# Patient Record
Sex: MALE | Race: WHITE | NOT HISPANIC OR LATINO | URBAN - METROPOLITAN AREA
[De-identification: names, ages, dates, MRNs, and addresses within clinical notes are randomized per-mention and may not be internally consistent; named-entity substitution may affect disease eponyms.]

---

## 2024-03-12 ENCOUNTER — OFFICE VISIT (OUTPATIENT)
Dept: INTERNAL MEDICINE | Facility: CLINIC | Age: 54
End: 2024-03-12
Payer: COMMERCIAL

## 2024-03-12 VITALS
DIASTOLIC BLOOD PRESSURE: 84 MMHG | TEMPERATURE: 98 F | OXYGEN SATURATION: 99 % | HEART RATE: 83 BPM | HEIGHT: 73 IN | BODY MASS INDEX: 26.5 KG/M2 | SYSTOLIC BLOOD PRESSURE: 126 MMHG | WEIGHT: 199.94 LBS

## 2024-03-12 DIAGNOSIS — Z12.11 COLON CANCER SCREENING: ICD-10-CM

## 2024-03-12 DIAGNOSIS — Z00.00 ROUTINE GENERAL MEDICAL EXAMINATION AT A HEALTH CARE FACILITY: Primary | ICD-10-CM

## 2024-03-12 PROCEDURE — 3008F BODY MASS INDEX DOCD: CPT | Mod: CPTII,S$GLB,, | Performed by: INTERNAL MEDICINE

## 2024-03-12 PROCEDURE — 1159F MED LIST DOCD IN RCRD: CPT | Mod: CPTII,S$GLB,, | Performed by: INTERNAL MEDICINE

## 2024-03-12 PROCEDURE — 3074F SYST BP LT 130 MM HG: CPT | Mod: CPTII,S$GLB,, | Performed by: INTERNAL MEDICINE

## 2024-03-12 PROCEDURE — 99386 PREV VISIT NEW AGE 40-64: CPT | Mod: S$GLB,,, | Performed by: INTERNAL MEDICINE

## 2024-03-12 PROCEDURE — 3079F DIAST BP 80-89 MM HG: CPT | Mod: CPTII,S$GLB,, | Performed by: INTERNAL MEDICINE

## 2024-03-12 PROCEDURE — 99999 PR PBB SHADOW E&M-NEW PATIENT-LVL III: CPT | Mod: PBBFAC,,, | Performed by: INTERNAL MEDICINE

## 2024-03-12 NOTE — PROGRESS NOTES
Subjective:      Patient ID: Wil Gamble is a 53 y.o. male.    Chief Complaint: Establish Care    HPI      53 y.o. with  Patient Active Problem List   Diagnosis    Routine general medical examination at a health care facility     History reviewed. No pertinent past medical history.    Here today for annual prev exam.  Compliant with meds without significant side effects. Energy and appetite are good.   Reports never smoked.   Requests testosterone testing. No libido nor erection issues. Wakes at 3 am. Falls asleep around if not moving around 8 pm. Reports used to be able to stay up longer.  Denies snoring/gasping.         Past Surgical History:   Procedure Laterality Date    CHOLECYSTECTOMY  2015    EYE SURGERY  12/1999    Lasik    TONSILLECTOMY  1/1975    VASECTOMY  2016     Social History     Socioeconomic History    Marital status:    Tobacco Use    Smoking status: Never    Smokeless tobacco: Never   Substance and Sexual Activity    Alcohol use: Not Currently     Alcohol/week: 1.0 standard drink of alcohol     Types: 1 Cans of beer per week     Comment: 2-3 beers or equivalent per month    Drug use: Never    Sexual activity: Yes     Partners: Female     Birth control/protection: Partner-Vasectomy     family history includes Arthritis in his maternal grandmother, mother, and sister; Cancer in his maternal grandfather; Diabetes in his father and paternal grandmother; Hearing loss in his father and paternal grandfather; Heart disease (age of onset: 55) in his paternal grandfather; Heart disease (age of onset: 60 - 69) in his father; Rheum arthritis in his mother.  Review of Systems   Constitutional:  Negative for chills and fever.   HENT:  Negative for ear pain and sore throat.    Respiratory:  Negative for cough.    Cardiovascular:  Negative for chest pain.   Gastrointestinal:  Negative for abdominal pain and blood in stool.   Genitourinary:  Negative for dysuria and hematuria.   Neurological:  Negative for  "seizures and syncope.     Objective:   /84 (BP Location: Right arm, Patient Position: Sitting, BP Method: Large (Manual))   Pulse 83   Temp 98.3 °F (36.8 °C) (Tympanic)   Ht 6' 1" (1.854 m)   Wt 90.7 kg (199 lb 15.3 oz)   SpO2 99%   BMI 26.38 kg/m²     Physical Exam  Constitutional:       General: He is not in acute distress.     Appearance: He is well-developed.   HENT:      Head: Normocephalic and atraumatic.   Eyes:      Extraocular Movements: Extraocular movements intact.   Neck:      Thyroid: No thyromegaly.   Cardiovascular:      Rate and Rhythm: Normal rate and regular rhythm.   Pulmonary:      Breath sounds: Normal breath sounds. No wheezing or rales.   Abdominal:      General: Bowel sounds are normal.      Palpations: Abdomen is soft.      Tenderness: There is no abdominal tenderness.   Musculoskeletal:         General: No swelling.      Cervical back: Neck supple. No rigidity.   Lymphadenopathy:      Cervical: No cervical adenopathy.   Skin:     General: Skin is warm and dry.   Neurological:      Mental Status: He is alert and oriented to person, place, and time.   Psychiatric:         Behavior: Behavior normal.         No results found for: "WBC", "HGB", "HCT", "MCV", "PLT", "CHOL", "TRIG", "HDL", "LDLCALC", "ALT", "AST", "NA", "K", "CALCIUM", "CL", "CO2", "BUN", "CREATININE", "EGFRNORACEVR", "TSH", "PSA", "PSADIAG", "GLU", "HGBA1C", "MICROALBUR", "CPUWGEXE51YJ", "TOTALTESTOST", "BNP"       The ASCVD Risk score (Carla DK, et al., 2019) failed to calculate for the following reasons:    Cannot find a previous HDL lab    Cannot find a previous total cholesterol lab     Assessment:     1. Routine general medical examination at a health care facility    2. Colon cancer screening      Plan:   1. Routine general medical examination at a health care facility  Overview:  Heart healthy diet, regular exercise, and regular use of sunscreen.    reviewed    Orders:  -     Comprehensive Metabolic Panel; " Future; Expected date: 03/12/2024  -     CBC Auto Differential; Future; Expected date: 03/12/2024  -     TSH; Future; Expected date: 03/12/2024  -     Lipid Panel; Future; Expected date: 03/12/2024  -     Hepatitis C Antibody; Future; Expected date: 03/12/2024  -     HIV 1/2 Ag/Ab (4th Gen); Future; Expected date: 03/12/2024  -     Hemoglobin A1C; Future; Expected date: 03/12/2024  -     PSA, Screening; Future; Expected date: 03/12/2024  -     Testosterone Panel; Future; Expected date: 03/12/2024    2. Colon cancer screening  -     Ambulatory referral/consult to Endo Procedure ; Future; Expected date: 03/13/2024        Patient Instructions   Check with your pharmacy regarding new shingles vaccine.      Future Appointments   Date Time Provider Department Center   3/15/2024  8:55 AM LABORATORY, [facility] [place]   3/13/2025  2:40 PM Antoine Elliott MD [facility] [place]             Follow up in about 1 year (around 3/12/2025), or if symptoms worsen or fail to improve, for fasting labs friday.

## 2024-03-14 ENCOUNTER — HOSPITAL ENCOUNTER (OUTPATIENT)
Dept: PREADMISSION TESTING | Facility: HOSPITAL | Age: 54
Discharge: HOME | End: 2024-03-14
Attending: INTERNAL MEDICINE
Payer: COMMERCIAL

## 2024-03-14 DIAGNOSIS — Z12.11 COLON CANCER SCREENING: Primary | ICD-10-CM

## 2024-03-14 RX ORDER — SODIUM, POTASSIUM,MAG SULFATES 17.5-3.13G
1 SOLUTION, RECONSTITUTED, ORAL ORAL DAILY
Qty: 1 KIT | Refills: 0 | Status: SHIPPED | OUTPATIENT
Start: 2024-03-14 | End: 2024-03-16

## 2024-03-15 ENCOUNTER — LAB VISIT (OUTPATIENT)
Dept: LAB | Facility: HOSPITAL | Age: 54
End: 2024-03-15
Attending: INTERNAL MEDICINE
Payer: COMMERCIAL

## 2024-03-15 DIAGNOSIS — Z00.00 ROUTINE GENERAL MEDICAL EXAMINATION AT A HEALTH CARE FACILITY: ICD-10-CM

## 2024-03-15 LAB
ALBUMIN SERPL BCP-MCNC: 4.3 G/DL (ref 3.5–5.2)
ALP SERPL-CCNC: 44 U/L (ref 55–135)
ALT SERPL W/O P-5'-P-CCNC: 29 U/L (ref 10–44)
ANION GAP SERPL CALC-SCNC: 8 MMOL/L (ref 8–16)
AST SERPL-CCNC: 25 U/L (ref 10–40)
BASOPHILS # BLD AUTO: 0.03 K/UL (ref 0–0.2)
BASOPHILS NFR BLD: 0.6 % (ref 0–1.9)
BILIRUB SERPL-MCNC: 0.6 MG/DL (ref 0.1–1)
BUN SERPL-MCNC: 20 MG/DL (ref 6–20)
CALCIUM SERPL-MCNC: 9.7 MG/DL (ref 8.7–10.5)
CHLORIDE SERPL-SCNC: 108 MMOL/L (ref 95–110)
CHOLEST SERPL-MCNC: 241 MG/DL (ref 120–199)
CHOLEST/HDLC SERPL: 5.7 {RATIO} (ref 2–5)
CO2 SERPL-SCNC: 27 MMOL/L (ref 23–29)
COMPLEXED PSA SERPL-MCNC: 0.3 NG/ML (ref 0–4)
CREAT SERPL-MCNC: 0.9 MG/DL (ref 0.5–1.4)
DIFFERENTIAL METHOD BLD: NORMAL
EOSINOPHIL # BLD AUTO: 0.1 K/UL (ref 0–0.5)
EOSINOPHIL NFR BLD: 1.4 % (ref 0–8)
ERYTHROCYTE [DISTWIDTH] IN BLOOD BY AUTOMATED COUNT: 11.9 % (ref 11.5–14.5)
EST. GFR  (NO RACE VARIABLE): >60 ML/MIN/1.73 M^2
ESTIMATED AVG GLUCOSE: 97 MG/DL (ref 68–131)
GLUCOSE SERPL-MCNC: 79 MG/DL (ref 70–110)
HBA1C MFR BLD: 5 % (ref 4–5.6)
HCT VFR BLD AUTO: 44.9 % (ref 40–54)
HCV AB SERPL QL IA: NORMAL
HDLC SERPL-MCNC: 42 MG/DL (ref 40–75)
HDLC SERPL: 17.4 % (ref 20–50)
HGB BLD-MCNC: 15.4 G/DL (ref 14–18)
HIV 1+2 AB+HIV1 P24 AG SERPL QL IA: NORMAL
IMM GRANULOCYTES # BLD AUTO: 0.02 K/UL (ref 0–0.04)
IMM GRANULOCYTES NFR BLD AUTO: 0.4 % (ref 0–0.5)
LDLC SERPL CALC-MCNC: 176.2 MG/DL (ref 63–159)
LYMPHOCYTES # BLD AUTO: 1.4 K/UL (ref 1–4.8)
LYMPHOCYTES NFR BLD: 29.5 % (ref 18–48)
MCH RBC QN AUTO: 30.3 PG (ref 27–31)
MCHC RBC AUTO-ENTMCNC: 34.3 G/DL (ref 32–36)
MCV RBC AUTO: 88 FL (ref 82–98)
MONOCYTES # BLD AUTO: 0.6 K/UL (ref 0.3–1)
MONOCYTES NFR BLD: 12.4 % (ref 4–15)
NEUTROPHILS # BLD AUTO: 2.7 K/UL (ref 1.8–7.7)
NEUTROPHILS NFR BLD: 55.7 % (ref 38–73)
NONHDLC SERPL-MCNC: 199 MG/DL
NRBC BLD-RTO: 0 /100 WBC
PLATELET # BLD AUTO: 236 K/UL (ref 150–450)
PMV BLD AUTO: 9.2 FL (ref 9.2–12.9)
POTASSIUM SERPL-SCNC: 4.5 MMOL/L (ref 3.5–5.1)
PROT SERPL-MCNC: 7.1 G/DL (ref 6–8.4)
RBC # BLD AUTO: 5.09 M/UL (ref 4.6–6.2)
SODIUM SERPL-SCNC: 143 MMOL/L (ref 136–145)
TRIGL SERPL-MCNC: 114 MG/DL (ref 30–150)
TSH SERPL DL<=0.005 MIU/L-ACNC: 1.57 UIU/ML (ref 0.4–4)
WBC # BLD AUTO: 4.85 K/UL (ref 3.9–12.7)

## 2024-03-15 PROCEDURE — 84443 ASSAY THYROID STIM HORMONE: CPT | Performed by: INTERNAL MEDICINE

## 2024-03-15 PROCEDURE — 86803 HEPATITIS C AB TEST: CPT | Performed by: INTERNAL MEDICINE

## 2024-03-15 PROCEDURE — 83036 HEMOGLOBIN GLYCOSYLATED A1C: CPT | Performed by: INTERNAL MEDICINE

## 2024-03-15 PROCEDURE — 82040 ASSAY OF SERUM ALBUMIN: CPT | Performed by: INTERNAL MEDICINE

## 2024-03-15 PROCEDURE — 80061 LIPID PANEL: CPT | Performed by: INTERNAL MEDICINE

## 2024-03-15 PROCEDURE — 85025 COMPLETE CBC W/AUTO DIFF WBC: CPT | Performed by: INTERNAL MEDICINE

## 2024-03-15 PROCEDURE — 87389 HIV-1 AG W/HIV-1&-2 AB AG IA: CPT | Performed by: INTERNAL MEDICINE

## 2024-03-15 PROCEDURE — 36415 COLL VENOUS BLD VENIPUNCTURE: CPT | Performed by: INTERNAL MEDICINE

## 2024-03-15 PROCEDURE — 80053 COMPREHEN METABOLIC PANEL: CPT | Performed by: INTERNAL MEDICINE

## 2024-03-15 PROCEDURE — 84153 ASSAY OF PSA TOTAL: CPT | Performed by: INTERNAL MEDICINE

## 2024-03-24 LAB
ALBUMIN SERPL-MCNC: 4.5 G/DL (ref 3.6–5.1)
SHBG SERPL-SCNC: 47 NMOL/L (ref 10–50)
TESTOST FREE SERPL-MCNC: 68.8 PG/ML (ref 46–224)
TESTOST SERPL-MCNC: 664 NG/DL (ref 250–1100)
TESTOSTERONE.FREE+WB SERPL-MCNC: 141.6 NG/DL

## 2024-03-25 ENCOUNTER — ANESTHESIA EVENT (OUTPATIENT)
Dept: ENDOSCOPY | Facility: HOSPITAL | Age: 54
End: 2024-03-25
Payer: COMMERCIAL

## 2024-03-25 ENCOUNTER — ANESTHESIA (OUTPATIENT)
Dept: ENDOSCOPY | Facility: HOSPITAL | Age: 54
End: 2024-03-25
Payer: COMMERCIAL

## 2024-03-25 ENCOUNTER — HOSPITAL ENCOUNTER (OUTPATIENT)
Facility: HOSPITAL | Age: 54
Discharge: HOME | End: 2024-03-25
Attending: INTERNAL MEDICINE | Admitting: INTERNAL MEDICINE
Payer: COMMERCIAL

## 2024-03-25 DIAGNOSIS — Z12.11 COLON CANCER SCREENING: ICD-10-CM

## 2024-03-25 PROCEDURE — 45385 COLONOSCOPY W/LESION REMOVAL: CPT | Mod: 33,,, | Performed by: INTERNAL MEDICINE

## 2024-03-25 PROCEDURE — 63600175 PHARM REV CODE 636 W HCPCS: Performed by: NURSE ANESTHETIST, CERTIFIED REGISTERED

## 2024-03-25 PROCEDURE — 88305 TISSUE EXAM BY PATHOLOGIST: CPT | Mod: 26,,, | Performed by: PATHOLOGY

## 2024-03-25 PROCEDURE — 37000009 HC ANESTHESIA EA ADD 15 MINS: Performed by: INTERNAL MEDICINE

## 2024-03-25 PROCEDURE — 37000008 HC ANESTHESIA 1ST 15 MINUTES: Performed by: INTERNAL MEDICINE

## 2024-03-25 PROCEDURE — 27201089 HC SNARE, DISP (ANY): Performed by: INTERNAL MEDICINE

## 2024-03-25 PROCEDURE — 25000003 PHARM REV CODE 250: Performed by: INTERNAL MEDICINE

## 2024-03-25 PROCEDURE — 88305 TISSUE EXAM BY PATHOLOGIST: CPT | Performed by: PATHOLOGY

## 2024-03-25 PROCEDURE — 45385 COLONOSCOPY W/LESION REMOVAL: CPT | Mod: PT | Performed by: INTERNAL MEDICINE

## 2024-03-25 PROCEDURE — 25000003 PHARM REV CODE 250: Performed by: NURSE ANESTHETIST, CERTIFIED REGISTERED

## 2024-03-25 RX ORDER — DEXTROMETHORPHAN/PSEUDOEPHED 2.5-7.5/.8
DROPS ORAL
Status: DISCONTINUED | OUTPATIENT
Start: 2024-03-25 | End: 2024-03-25 | Stop reason: HOSPADM

## 2024-03-25 RX ORDER — PROPOFOL 10 MG/ML
VIAL (ML) INTRAVENOUS
Status: DISCONTINUED | OUTPATIENT
Start: 2024-03-25 | End: 2024-03-25

## 2024-03-25 RX ORDER — LIDOCAINE HYDROCHLORIDE 10 MG/ML
INJECTION, SOLUTION EPIDURAL; INFILTRATION; INTRACAUDAL; PERINEURAL
Status: DISCONTINUED | OUTPATIENT
Start: 2024-03-25 | End: 2024-03-25

## 2024-03-25 RX ADMIN — PROPOFOL 50 MG: 10 INJECTION, EMULSION INTRAVENOUS at 10:03

## 2024-03-25 RX ADMIN — SODIUM CHLORIDE, POTASSIUM CHLORIDE, SODIUM LACTATE AND CALCIUM CHLORIDE: 600; 310; 30; 20 INJECTION, SOLUTION INTRAVENOUS at 10:03

## 2024-03-25 RX ADMIN — PROPOFOL 30 MG: 10 INJECTION, EMULSION INTRAVENOUS at 10:03

## 2024-03-25 RX ADMIN — PROPOFOL 100 MG: 10 INJECTION, EMULSION INTRAVENOUS at 10:03

## 2024-03-25 RX ADMIN — LIDOCAINE HYDROCHLORIDE 50 MG: 10 SOLUTION INTRAVENOUS at 10:03

## 2024-03-25 NOTE — DISCHARGE SUMMARY
[facility] - Endoscopy (Hospital)  Discharge Note  Short Stay    Procedure(s) (LRB):  COLONOSCOPY (N/A)      OUTCOME: Patient tolerated treatment/procedure well without complication and is now ready for discharge.    DISPOSITION: Home or Self Care    FINAL DIAGNOSIS:  Colon cancer screening    FOLLOWUP: With primary care provider    DISCHARGE INSTRUCTIONS:  No discharge procedures on file.      Clinical Reference Documents Added to Patient Instructions         Document    COLON POLYPECTOMY (ENGLISH)            TIME SPENT ON DISCHARGE: 20 minutes

## 2024-03-25 NOTE — TRANSFER OF CARE
"Anesthesia Transfer of Care Note    Patient: Wil Gamble    Procedure(s) Performed: Procedure(s) (LRB):  COLONOSCOPY (N/A)    Patient location: GI    Anesthesia Type: MAC    Transport from OR: Transported from OR on room air with adequate spontaneous ventilation    Post pain: adequate analgesia    Post assessment: no apparent anesthetic complications    Post vital signs: stable    Level of consciousness: responds to stimulation    Nausea/Vomiting: no nausea/vomiting    Complications: none    Transfer of care protocol was followed      Last vitals: Visit Vitals  /70 (BP Location: Left arm, Patient Position: Lying)   Pulse (!) 59   Temp 36.6 °C (97.9 °F) (Temporal)   Resp 18   Ht 6' 1" (1.854 m)   Wt 86.2 kg (190 lb)   SpO2 100%   BMI 25.07 kg/m²     "

## 2024-03-25 NOTE — ANESTHESIA POSTPROCEDURE EVALUATION
Anesthesia Post Evaluation    Patient: Wil Gamble    Procedure(s) Performed: Procedure(s) (LRB):  COLONOSCOPY (N/A)    Final Anesthesia Type: MAC      Patient location during evaluation: GI PACU  Patient participation: Yes- Able to Participate  Level of consciousness: awake and alert  Post-procedure vital signs: reviewed and stable  Pain management: adequate  Airway patency: patent    PONV status at discharge: No PONV  Anesthetic complications: no      Cardiovascular status: hemodynamically stable  Respiratory status: unassisted, room air and spontaneous ventilation  Hydration status: euvolemic  Follow-up not needed.              Vitals Value Taken Time   /72 03/25/24 1125   Temp 36.4 °C (97.5 °F) 03/25/24 1125   Pulse 60 03/25/24 1125   Resp 17 03/25/24 1125   SpO2 100 % 03/25/24 1125         Event Time   Out of Recovery 11:27:08         Pain/Nazanin Score: Nazanin Score: 10 (3/25/2024 11:25 AM)

## 2024-03-25 NOTE — PLAN OF CARE
Dr. Camp  at bedside discussing findings. VSS. Patient alert. No N/V. No bleeding, no pain. Patient released from unit.

## 2024-03-25 NOTE — PROVATION PATIENT INSTRUCTIONS
Discharge Summary/Instructions after an Endoscopic Procedure  Patient Name: Wil Gamble  Patient MRN: [id number]  Patient YOB: 1970 Monday, March 25, 2024 Tony Camp MD  Dear patient,  As a result of recent federal legislation (The Federal Cures Act), you may   receive lab or pathology results from your procedure in your [facility]   account before your physician is able to contact you. Your physician or   their representative will relay the results to you with their   recommendations at their soonest availability.  Thank you,  RESTRICTIONS:  During your procedure today, you received medications for sedation.  These   medications may affect your judgment, balance and coordination.  Therefore,   for 24 hours, you have the following restrictions:   - DO NOT drive a car, operate machinery, make legal/financial decisions,   sign important papers or drink alcohol.    ACTIVITY:  Today: no heavy lifting, straining or running due to procedural   sedation/anesthesia.  The following day: return to full activity including work.  DIET:  Eat and drink normally unless instructed otherwise.     TREATMENT FOR COMMON SIDE EFFECTS:  - Mild abdominal pain, nausea, belching, bloating or excessive gas:  rest,   eat lightly and use a heating pad.  - Sore Throat: treat with throat lozenges and/or gargle with warm salt   water.  - Because air was used during the procedure, expelling large amounts of air   from your rectum or belching is normal.  - If a bowel prep was taken, you may not have a bowel movement for 1-3 days.    This is normal.  SYMPTOMS TO WATCH FOR AND REPORT TO YOUR PHYSICIAN:  1. Abdominal pain or bloating, other than gas cramps.  2. Chest pain.  3. Back pain.  4. Signs of infection such as: chills or fever occurring within 24 hours   after the procedure.  5. Rectal bleeding, which would show as bright red, maroon, or black stools.   (A tablespoon of blood from the rectum is not serious, especially  if   hemorrhoids are present.)  6. Vomiting.  7. Weakness or dizziness.  GO DIRECTLY TO THE NEAREST EMERGENCY ROOM IF YOU HAVE ANY OF THE FOLLOWING:      Difficulty breathing              Chills and/or fever over 101 F   Persistent vomiting and/or vomiting blood   Severe abdominal pain   Severe chest pain   Black, tarry stools   Bleeding- more than one tablespoon   Any other symptom or condition that you feel may need urgent attention  Your doctor recommends these additional instructions:  If any biopsies were taken, your doctors clinic will contact you in 1 to 2   weeks with any results.  - Discharge patient to home.   - Resume previous diet.   - Continue present medications.   - Await pathology results.   - Repeat colonoscopy in 5 years for surveillance.   - Return to referring physician as previously scheduled.   - Patient has a contact number available for emergencies.  The signs and   symptoms of potential delayed complications were discussed with the   patient.  Return to normal activities tomorrow.  Written discharge   instructions were provided to the patient.  For questions, problems or results please call your physician Tony Camp MD at Work:  [phone]  If you have any questions about the above instructions, call the GI   department at [phone] or call the endoscopy unit at [phone]   from 7am until 3 pm.  [facility] [place], EMERGENCY ROOM PHONE NUMBER:   [phone]  IF A COMPLICATION OR EMERGENCY SITUATION ARISES AND YOU ARE UNABLE TO REACH   YOUR PHYSICIAN - GO DIRECTLY TO THE EMERGENCY ROOM.  I have read or have had read to me these discharge instructions for my   procedure and have received a written copy.  I understand these   instructions and will follow-up with my physician if I have any questions.     __________________________________       _____________________________________  Nurse Signature                                           Patient/Designated   Responsible Party Signature  MD Tony Castro MD  3/25/2024 11:04:19 AM  This report has been verified and signed electronically.  Dear patient,  As a result of recent federal legislation (The Federal Cures Act), you may   receive lab or pathology results from your procedure in your [facility]   account before your physician is able to contact you. Your physician or   their representative will relay the results to you with their   recommendations at their soonest availability.  Thank you,  PROVATION

## 2024-03-25 NOTE — H&P
Endoscopy History and Physical    PCP - Antoine Elliott MD  Referring Physician - Antoine Elliott MD  [id number] [facility] [facility]  [place] [id number]      ASA - per anesthesia  Mallampati - per anesthesia  History of Anesthesia problems - no  Family history Anesthesia problems -  no   Plan of anesthesia - General    HPI  53 y.o. male    Planned Procedure: Colonoscopy  Diagnosis: screening for colon cancer  Chief Complaint: Same as above    Personnel H/o colon polyps:no  FH of colon cancer:no  Anticoagulation:no      ROS:  Constitutional: No fevers, chills, No weight loss  CV: No chest pain  Pulm: No cough, No shortness of breath  GI: see HPI    Medical History:  has no past medical history on file.    Surgical History:  has a past surgical history that includes Eye surgery (12/1999); Tonsillectomy (1/1975); Cholecystectomy (2015); and Vasectomy (2016).    Family History: family history includes Arthritis in his maternal grandmother, mother, and sister; Cancer in his maternal grandfather; Diabetes in his father and paternal grandmother; Hearing loss in his father and paternal grandfather; Heart disease (age of onset: 55) in his paternal grandfather; Heart disease (age of onset: 60 - 69) in his father; Rheum arthritis in his mother..    Social History:  reports that he has never smoked. He has never used smokeless tobacco. He reports that he does not currently use alcohol after a past usage of about 1.0 standard drink of alcohol per week. He reports that he does not use drugs.    Review of patient's allergies indicates:  No Known Allergies    Medications:   No medications prior to admission.       Physical Exam:    Vital Signs: There were no vitals filed for this visit.    General Appearance: Well appearing in no acute distress  Abdomen: Soft, non tender, non distended with normal bowel sounds, no masses    Labs:  Lab Results   Component Value Date    WBC 4.85 03/15/2024    HGB 15.4 03/15/2024    HCT 44.9 03/15/2024      03/15/2024    CHOL 241 (H) 03/15/2024    TRIG 114 03/15/2024    HDL 42 03/15/2024    ALT 29 03/15/2024    AST 25 03/15/2024     03/15/2024    K 4.5 03/15/2024     03/15/2024    CREATININE 0.9 03/15/2024    BUN 20 03/15/2024    CO2 27 03/15/2024    TSH 1.566 03/15/2024    PSA 0.30 03/15/2024    HGBA1C 5.0 03/15/2024       I have explained the risks and benefits of this endoscopic procedure to the patient including but not limited to bleeding, inflammation, infection, perforation, and death.    SEDATION PLAN: per anesthesia       History reviewed, vital signs satisfactory, cardiopulmonary status satisfactory, sedation options, risks and plans have been discussed with the patient  All their questions were answered and the patient agrees to the sedation procedures as planned and the patient is deemed an appropriate candidate for the sedation as planned.     The risks, benefits and alternatives of the procedure were discussed with the patient in detail. This discussion was had in the presence of endoscopy staff. The risks include, risks of adverse reaction to sedation requiring the use of reversal agents, bleeding requiring blood transfusion, perforation requiring surgical intervention and technical failure. Other risks include aspiration leading to respiratory distress and respiratory failure resulting in endotracheal intubation and mechanical ventilation including death. If anesthesia is being utilized for this procedure, it is up to the anesthesiologist to determine airway safety including elective endotracheal intubation. Questions were answered, they agree to proceed. There was no language barriers.       Procedure explained to patient, informed consent obtained and placed in chart.       Tony Camp MD

## 2024-03-25 NOTE — ANESTHESIA PREPROCEDURE EVALUATION
03/25/2024  Wil Gamble is a 53 y.o., male.      Pre-op Assessment    I have reviewed the Patient Summary Reports.     I have reviewed the Nursing Notes. I have reviewed the NPO Status.   I have reviewed the Medications.     Review of Systems  Anesthesia Hx:  No problems with previous Anesthesia                Social:  Non-Smoker       EENT/Dental:  EENT/Dental Normal           Cardiovascular:  Cardiovascular Normal                                            Pulmonary:  Pulmonary Normal                       Renal/:  Renal/ Normal                 Hepatic/GI:  Bowel Prep.                Musculoskeletal:  Musculoskeletal Normal                Neurological:  Neurology Normal                                      Endocrine:  Endocrine Normal            Psych:  Psychiatric Normal                    Physical Exam  General: Well nourished, Alert, Cooperative and Oriented    Airway:  Mallampati: II   Mouth Opening: Normal  TM Distance: Normal  Tongue: Normal  Neck ROM: Normal ROM    Dental:  Intact        Anesthesia Plan  Type of Anesthesia, risks & benefits discussed:    Anesthesia Type: Gen Natural Airway, MAC  Intra-op Monitoring Plan: Standard ASA Monitors  Post Op Pain Control Plan: multimodal analgesia  Induction:  IV  Informed Consent: Informed consent signed with the Patient and all parties understand the risks and agree with anesthesia plan.  All questions answered.   ASA Score: 1  Day of Surgery Review of History & Physical: H&P Update referred to the surgeon/provider.I have interviewed and examined the patient. I have reviewed the patient's H&P dated: There are no significant changes.     Ready For Surgery From Anesthesia Perspective.     .

## 2024-03-26 VITALS
BODY MASS INDEX: 25.18 KG/M2 | RESPIRATION RATE: 17 BRPM | HEART RATE: 60 BPM | TEMPERATURE: 98 F | WEIGHT: 190 LBS | DIASTOLIC BLOOD PRESSURE: 72 MMHG | HEIGHT: 73 IN | SYSTOLIC BLOOD PRESSURE: 124 MMHG | OXYGEN SATURATION: 100 %

## 2024-03-27 DIAGNOSIS — Z13.6 ENCOUNTER FOR SCREENING FOR CARDIOVASCULAR DISORDERS: Primary | ICD-10-CM

## 2024-03-27 DIAGNOSIS — Z91.89 INTERMEDIATE RISK FOR CORONARY ARTERY DISEASE: ICD-10-CM

## 2024-03-27 LAB
FINAL PATHOLOGIC DIAGNOSIS: NORMAL
GROSS: NORMAL
Lab: NORMAL

## 2025-03-13 ENCOUNTER — OFFICE VISIT (OUTPATIENT)
Dept: INTERNAL MEDICINE | Facility: CLINIC | Age: 55
End: 2025-03-13
Payer: COMMERCIAL

## 2025-03-13 VITALS
HEART RATE: 74 BPM | WEIGHT: 198.44 LBS | DIASTOLIC BLOOD PRESSURE: 82 MMHG | OXYGEN SATURATION: 98 % | BODY MASS INDEX: 26.3 KG/M2 | HEIGHT: 73 IN | SYSTOLIC BLOOD PRESSURE: 122 MMHG | TEMPERATURE: 98 F

## 2025-03-13 DIAGNOSIS — R13.10 DYSPHAGIA, UNSPECIFIED TYPE: ICD-10-CM

## 2025-03-13 DIAGNOSIS — Z00.00 ROUTINE GENERAL MEDICAL EXAMINATION AT A HEALTH CARE FACILITY: Primary | ICD-10-CM

## 2025-03-13 PROCEDURE — 3044F HG A1C LEVEL LT 7.0%: CPT | Mod: CPTII,S$GLB,, | Performed by: INTERNAL MEDICINE

## 2025-03-13 PROCEDURE — 99999 PR PBB SHADOW E&M-EST. PATIENT-LVL IV: CPT | Mod: PBBFAC,,, | Performed by: INTERNAL MEDICINE

## 2025-03-13 PROCEDURE — 3074F SYST BP LT 130 MM HG: CPT | Mod: CPTII,S$GLB,, | Performed by: INTERNAL MEDICINE

## 2025-03-13 PROCEDURE — 3008F BODY MASS INDEX DOCD: CPT | Mod: CPTII,S$GLB,, | Performed by: INTERNAL MEDICINE

## 2025-03-13 PROCEDURE — 3079F DIAST BP 80-89 MM HG: CPT | Mod: CPTII,S$GLB,, | Performed by: INTERNAL MEDICINE

## 2025-03-13 PROCEDURE — 99396 PREV VISIT EST AGE 40-64: CPT | Mod: S$GLB,,, | Performed by: INTERNAL MEDICINE

## 2025-03-13 NOTE — PROGRESS NOTES
"Subjective:      Patient ID: Wil Gamble is a 54 y.o. male.    Chief Complaint: Annual Exam    HPI  History of Present Illness               54 y.o. with  Problem List[1]  History reviewed. No pertinent past medical history.    Here today for annual prev exam.  Compliant with meds without significant side effects. Energy and appetite are good.     Declined flu and tetanus vaccine.     Pt also c/o pharyngeal esophageal aphasia dysphagia for about 8 months. Solids only. Occurs at least twice per day.     Past Surgical History:   Procedure Laterality Date    CHOLECYSTECTOMY  2015    COLONOSCOPY N/A 3/25/2024    Procedure: COLONOSCOPY;  Surgeon: Tony Camp MD;  Location: [facility];  Service: Endoscopy;  Laterality: N/A;    EYE SURGERY  12/1999    Lasik    TONSILLECTOMY  1/1975    VASECTOMY  2016     Social History[2]  family history includes Arthritis in his maternal grandmother, mother, and sister; Cancer in his maternal grandfather; Diabetes in his father and paternal grandmother; Hearing loss in his father and paternal grandfather; Heart disease (age of onset: 55) in his paternal grandfather; Heart disease (age of onset: 60 - 69) in his father; Rheum arthritis in his mother.  Review of Systems   Constitutional:  Negative for chills and fever.   HENT:  Negative for ear pain and sore throat.    Respiratory:  Negative for cough.    Cardiovascular:  Negative for chest pain.   Gastrointestinal:  Negative for abdominal pain and blood in stool.   Genitourinary:  Negative for dysuria and hematuria.   Neurological:  Negative for seizures and syncope.     Objective:   /82 (BP Location: Right arm, Patient Position: Sitting)   Pulse 74   Temp 97.5 °F (36.4 °C) (Tympanic)   Ht 6' 1" (1.854 m)   Wt 90 kg (198 lb 6.6 oz)   SpO2 98%   BMI 26.18 kg/m²     Physical Exam  Constitutional:       General: He is not in acute distress.     Appearance: He is well-developed.   HENT:      Head: Normocephalic and " atraumatic.   Eyes:      Extraocular Movements: Extraocular movements intact.   Neck:      Thyroid: No thyromegaly.   Cardiovascular:      Rate and Rhythm: Normal rate and regular rhythm.   Pulmonary:      Breath sounds: Normal breath sounds. No wheezing or rales.   Abdominal:      General: Bowel sounds are normal.      Palpations: Abdomen is soft.      Tenderness: There is no abdominal tenderness.   Musculoskeletal:         General: No swelling.      Cervical back: Neck supple. No rigidity.   Lymphadenopathy:      Cervical: No cervical adenopathy.   Skin:     General: Skin is warm and dry.   Neurological:      Mental Status: He is alert and oriented to person, place, and time.   Psychiatric:         Behavior: Behavior normal.         Lab Results   Component Value Date    WBC 4.54 03/14/2025    HGB 14.6 03/14/2025    HGB 15.4 03/15/2024    HCT 42.4 03/14/2025    MCV 87 03/14/2025    MCV 88 03/15/2024     03/14/2025    CHOL 228 (H) 03/14/2025    TRIG 106 03/14/2025    HDL 40 03/14/2025    LDLCALC 166.8 (H) 03/14/2025    LDLCALC 176.2 (H) 03/15/2024    ALT 21 03/14/2025    AST 22 03/14/2025     03/14/2025    K 4.3 03/14/2025    CALCIUM 8.9 03/14/2025     03/14/2025    CO2 28 03/14/2025    BUN 16 03/14/2025    CREATININE 0.8 03/14/2025    CREATININE 0.9 03/15/2024    EGFRNORACEVR >60.0 03/14/2025    EGFRNORACEVR >60.0 03/15/2024    TSH 1.290 03/14/2025    TSH 1.566 03/15/2024    PSA 0.37 03/14/2025    PSA 0.30 03/15/2024    GLU 80 03/14/2025    HGBA1C 5.0 03/14/2025    HGBA1C 5.0 03/15/2024          The 10-year ASCVD risk score (Carla ELIZALDE, et al., 2019) is: 6.2%    Values used to calculate the score:      Age: 54 years      Sex: Male      Is Non- : No      Diabetic: No      Tobacco smoker: No      Systolic Blood Pressure: 122 mmHg      Is BP treated: No      HDL Cholesterol: 40 mg/dL      Total Cholesterol: 228 mg/dL     Assessment:     1. Routine general medical examination  at a health care facility    2. Dysphagia, unspecified type      Plan:   1. Routine general medical examination at a health care facility  Overview:  Heart healthy diet and regular exercise.  Regular use of sunscreen.Health maintenance reviewed.      Orders:  -     Comprehensive Metabolic Panel; Future; Expected date: 03/13/2025  -     CBC Auto Differential; Future; Expected date: 03/13/2025  -     TSH; Future; Expected date: 03/13/2025  -     Lipid Panel; Future; Expected date: 03/13/2025  -     Hemoglobin A1C; Future; Expected date: 03/13/2025  -     PSA, Screening; Future; Expected date: 03/13/2025  -     Apolipoprotein B; Future; Expected date: 03/13/2025  -     LIPOPROTEIN A (LPA); Future; Expected date: 03/13/2025  -     HIGH SENSITIVITY CRP; Future; Expected date: 03/13/2025    2. Dysphagia, unspecified type  -     Ambulatory referral/consult to ENT; Future; Expected date: 03/20/2025        Patient Instructions   Check with your pharmacy regarding shingles vaccine.      Future Appointments   Date Time Provider Department Center   3/31/2025  2:00 PM [facility] [id number] [facility] XRAY [place]   3/31/2025  2:00 PM Anamaria Stroud L-SLP, CCC-SLP [facility] SPEECH [place]   6/24/2025  7:35 AM LABORATORY, [facility] LAB [facility]   7/1/2025  2:40 PM Antoine Elliott MD [facility] [place]   3/12/2026  2:40 PM Antoine Elliott MD [facility] [place]       Lab Frequency Next Occurrence   Apolipoprotein B Once 03/27/2024   LIPOPROTEIN A (LPA) Once 03/27/2024   CT Cardiac Scoring Once 03/27/2024       Follow up in about 1 year (around 3/13/2026), or if symptoms worsen or fail to improve, for fasting labs tomorrow.                  [1]   Patient Active Problem List  Diagnosis    Colon cancer screening    Routine general medical examination at a health care facility   [2]   Social History  Socioeconomic History    Marital status:    Tobacco Use    Smoking status: Never    Smokeless tobacco: Never   Substance and Sexual  Activity    Alcohol use: Not Currently     Alcohol/week: 1.0 standard drink of alcohol     Types: 1 Cans of beer per week     Comment: 2-3 beers or equivalent per month    Drug use: Never    Sexual activity: Yes     Partners: Female     Birth control/protection: Partner-Vasectomy     Social Drivers of Health     Financial Resource Strain: Low Risk  (3/10/2025)    Overall Financial Resource Strain (CARDIA)     Difficulty of Paying Living Expenses: Not hard at all   Food Insecurity: No Food Insecurity (3/10/2025)    Hunger Vital Sign     Worried About Running Out of Food in the Last Year: Never true     Ran Out of Food in the Last Year: Never true   Transportation Needs: No Transportation Needs (3/10/2025)    PRAPARE - Transportation     Lack of Transportation (Medical): No     Lack of Transportation (Non-Medical): No   Physical Activity: Insufficiently Active (3/10/2025)    Exercise Vital Sign     Days of Exercise per Week: 3 days     Minutes of Exercise per Session: 40 min   Stress: No Stress Concern Present (3/10/2025)    [employer] - Occupational Stress Questionnaire     Feeling of Stress : Not at all   Housing Stability: Low Risk  (3/10/2025)    Housing Stability Vital Sign     Unable to Pay for Housing in the Last Year: No     Number of Times Moved in the Last Year: 0     Homeless in the Last Year: No

## 2025-03-14 ENCOUNTER — LAB VISIT (OUTPATIENT)
Dept: LAB | Facility: HOSPITAL | Age: 55
End: 2025-03-14
Attending: INTERNAL MEDICINE
Payer: COMMERCIAL

## 2025-03-14 DIAGNOSIS — Z00.00 ROUTINE GENERAL MEDICAL EXAMINATION AT A HEALTH CARE FACILITY: ICD-10-CM

## 2025-03-14 LAB
ALBUMIN SERPL BCP-MCNC: 3.7 G/DL (ref 3.5–5.2)
ALP SERPL-CCNC: 39 U/L (ref 40–150)
ALT SERPL W/O P-5'-P-CCNC: 21 U/L (ref 10–44)
ANION GAP SERPL CALC-SCNC: 7 MMOL/L (ref 8–16)
AST SERPL-CCNC: 22 U/L (ref 10–40)
BASOPHILS # BLD AUTO: 0.04 K/UL (ref 0–0.2)
BASOPHILS NFR BLD: 0.9 % (ref 0–1.9)
BILIRUB SERPL-MCNC: 0.7 MG/DL (ref 0.1–1)
BUN SERPL-MCNC: 16 MG/DL (ref 6–20)
CALCIUM SERPL-MCNC: 8.9 MG/DL (ref 8.7–10.5)
CHLORIDE SERPL-SCNC: 107 MMOL/L (ref 95–110)
CHOLEST SERPL-MCNC: 228 MG/DL (ref 120–199)
CHOLEST/HDLC SERPL: 5.7 {RATIO} (ref 2–5)
CO2 SERPL-SCNC: 28 MMOL/L (ref 23–29)
COMPLEXED PSA SERPL-MCNC: 0.37 NG/ML (ref 0–4)
CREAT SERPL-MCNC: 0.8 MG/DL (ref 0.5–1.4)
CRP SERPL-MCNC: 2.6 MG/L (ref 0–3.19)
DIFFERENTIAL METHOD BLD: ABNORMAL
EOSINOPHIL # BLD AUTO: 0.1 K/UL (ref 0–0.5)
EOSINOPHIL NFR BLD: 2 % (ref 0–8)
ERYTHROCYTE [DISTWIDTH] IN BLOOD BY AUTOMATED COUNT: 11.7 % (ref 11.5–14.5)
EST. GFR  (NO RACE VARIABLE): >60 ML/MIN/1.73 M^2
ESTIMATED AVG GLUCOSE: 97 MG/DL (ref 68–131)
GLUCOSE SERPL-MCNC: 80 MG/DL (ref 70–110)
HBA1C MFR BLD: 5 % (ref 4–5.6)
HCT VFR BLD AUTO: 42.4 % (ref 40–54)
HDLC SERPL-MCNC: 40 MG/DL (ref 40–75)
HDLC SERPL: 17.5 % (ref 20–50)
HGB BLD-MCNC: 14.6 G/DL (ref 14–18)
IMM GRANULOCYTES # BLD AUTO: 0.02 K/UL (ref 0–0.04)
IMM GRANULOCYTES NFR BLD AUTO: 0.4 % (ref 0–0.5)
LDLC SERPL CALC-MCNC: 166.8 MG/DL (ref 63–159)
LYMPHOCYTES # BLD AUTO: 1.3 K/UL (ref 1–4.8)
LYMPHOCYTES NFR BLD: 28.2 % (ref 18–48)
MCH RBC QN AUTO: 30 PG (ref 27–31)
MCHC RBC AUTO-ENTMCNC: 34.4 G/DL (ref 32–36)
MCV RBC AUTO: 87 FL (ref 82–98)
MONOCYTES # BLD AUTO: 0.5 K/UL (ref 0.3–1)
MONOCYTES NFR BLD: 11.7 % (ref 4–15)
NEUTROPHILS # BLD AUTO: 2.6 K/UL (ref 1.8–7.7)
NEUTROPHILS NFR BLD: 56.8 % (ref 38–73)
NONHDLC SERPL-MCNC: 188 MG/DL
NRBC BLD-RTO: 0 /100 WBC
PLATELET # BLD AUTO: 229 K/UL (ref 150–450)
PMV BLD AUTO: 9 FL (ref 9.2–12.9)
POTASSIUM SERPL-SCNC: 4.3 MMOL/L (ref 3.5–5.1)
PROT SERPL-MCNC: 6.6 G/DL (ref 6–8.4)
RBC # BLD AUTO: 4.87 M/UL (ref 4.6–6.2)
SODIUM SERPL-SCNC: 142 MMOL/L (ref 136–145)
TRIGL SERPL-MCNC: 106 MG/DL (ref 30–150)
TSH SERPL DL<=0.005 MIU/L-ACNC: 1.29 UIU/ML (ref 0.4–4)
WBC # BLD AUTO: 4.54 K/UL (ref 3.9–12.7)

## 2025-03-14 PROCEDURE — 83695 ASSAY OF LIPOPROTEIN(A): CPT | Performed by: INTERNAL MEDICINE

## 2025-03-14 PROCEDURE — 83036 HEMOGLOBIN GLYCOSYLATED A1C: CPT | Performed by: INTERNAL MEDICINE

## 2025-03-14 PROCEDURE — 82172 ASSAY OF APOLIPOPROTEIN: CPT | Performed by: INTERNAL MEDICINE

## 2025-03-14 PROCEDURE — 36415 COLL VENOUS BLD VENIPUNCTURE: CPT | Performed by: INTERNAL MEDICINE

## 2025-03-14 PROCEDURE — 86141 C-REACTIVE PROTEIN HS: CPT | Performed by: INTERNAL MEDICINE

## 2025-03-14 PROCEDURE — 80053 COMPREHEN METABOLIC PANEL: CPT | Performed by: INTERNAL MEDICINE

## 2025-03-14 PROCEDURE — 80061 LIPID PANEL: CPT | Performed by: INTERNAL MEDICINE

## 2025-03-14 PROCEDURE — 84443 ASSAY THYROID STIM HORMONE: CPT | Performed by: INTERNAL MEDICINE

## 2025-03-14 PROCEDURE — 85025 COMPLETE CBC W/AUTO DIFF WBC: CPT | Performed by: INTERNAL MEDICINE

## 2025-03-14 PROCEDURE — 84153 ASSAY OF PSA TOTAL: CPT | Performed by: INTERNAL MEDICINE

## 2025-03-16 LAB — APO B SERPL-MCNC: 130 MG/DL

## 2025-03-18 LAB — LPA SERPL-MCNC: 16 MG/DL (ref 0–30)

## 2025-03-19 ENCOUNTER — RESULTS FOLLOW-UP (OUTPATIENT)
Dept: INTERNAL MEDICINE | Facility: CLINIC | Age: 55
End: 2025-03-19
Payer: COMMERCIAL

## 2025-03-19 NOTE — PROGRESS NOTES
Chief complaint:    Chief Complaint   Patient presents with    Dysphagia     Pt states he sometimes feels af if food gets stuck x 3-5 months            Referring Provider:  Antoine Elliott Md  [address] [facility]  [place] [address]      History of present illness:     Mr. Gamble is a 54 y.o. presenting for evaluation of dysphagia.     Onset: 8 months ago    [x]  Solid dysphagia -happens about once or twice a day, can be soft or solid foods, can be small amounts too  Feels like it gets stuck or he can't start the act of swallowing  Clears with drinking water  Feels at lower neck     []  Liquid dysphagia  []  Coughing/choking with swallow  []  Delayed regurgitation or vomiting  []  Tobacco exposure  []  Unintentional weight loss  []  Recurrent pneumonia  []  Globus sensation  []  Sensation of pills getting stuck  []  Heartburn or chest tightness  []  Voice change  []  Odynophagia  []  Otalgia  []  Neck mass  []  History of head & neck radiation  []  Neurologic disorder/symptoms    Treatment has included: none        History      Past Medical History: No past medical history on file.      Past Surgical History:  Past Surgical History:   Procedure Laterality Date    CHOLECYSTECTOMY  2015    COLONOSCOPY N/A 3/25/2024    Procedure: COLONOSCOPY;  Surgeon: Tony Camp MD;  Location: [facility];  Service: Endoscopy;  Laterality: N/A;    EYE SURGERY  12/1999    Lasik    TONSILLECTOMY  1/1975    VASECTOMY  2016         Medications: Medication list reviewed. He  currently has no medications in their medication list.     Allergies: Review of patient's allergies indicates:  No Known Allergies      Family history: family history includes Arthritis in his maternal grandmother, mother, and sister; Cancer in his maternal grandfather; Diabetes in his father and paternal grandmother; Hearing loss in his father and paternal grandfather; Heart disease (age of onset: 55) in his paternal grandfather; Heart disease (age of  "onset: 60 - 69) in his father; Rheum arthritis in his mother.         Social History          Alcohol use:  reports that he does not currently use alcohol after a past usage of about 1.0 standard drink of alcohol per week.            Tobacco:  reports that he has never smoked. He has never used smokeless tobacco.         Physical Examination      Vitals: Height 6' 1" (1.854 m), weight 90 kg (198 lb 6.6 oz).      General: Well developed, well nourished, well hydrated.   Voice: no hoarseness, no dysarthria      Head/Face: Normocephalic, atraumatic. No scars or lesions. Facial musculature equal.     Eyes: No scleral icterus or conjunctival hemorrhage. EOMI. PERRLA.     Ears:     Right ear: No gross deformity. EAC is clear of debris and erythema. TM are intact with a pneumatized middle ear. No signs of retraction, fluid or infection.      Left ear: No gross deformity. EAC is clear of debris and erythema. TM are intact with a pneumatized middle ear. No signs of retraction, fluid or infection.      Nose: No gross deformity or lesions. No purulent discharge. No significant NSD.      Mouth/Oropharynx: Lips without any lesions. No mucosal lesions within the oropharynx. No tonsillar exudate or lesions. Pharyngeal walls symmetrical. Uvula midline. Tongue midline without lesions.     Neck: Trachea midline. No masses. No thyromegaly or nodules palpated.     Lymphatic: No lymphadenopathy in the neck.     Extremities: No cyanosis. Warm and well-perfused.     Skin: No scars or lesions on face or neck.      Neurologic: Moving all extremities without gross abnormality.CN II-XII grossly intact. House-Brackmann 1/6. No signs of nystagmus.          Data reviewed      Review of records:      I reviewed records from the referring provider's office visits.  These describe the history, workup, and/or treatment of this problem thus far:         Procedures:    Procedure -Transnasal fiberoptic laryngoscopy     Surgeon: Maximiliano Alicea M.D. .  "     Anesthesia: topical 0.05% oxymetazoline with 4% lidocaine      Complications: None.     Description of Procedure: With the patient in the sitting position, topical lidocaine and oxymetazoline was applied to the nose. The scope was passed through the nose. Examination was carried out of the nose, nasopharynx, oropharynx, hypopharynx, and larynx with findings as noted above. Scope was removed. The patient tolerated the procedure well.      Findings:Normal vocal fold motion. No masses or lesions of the nose, nasopharynx, oropharynx, hypopharynx, or larynx. Mild erythema and post cricoid redundancy consistent with laryngopharyngeal reflux         Assessment/Plan:      1. Oropharyngeal dysphagia    2. Dysphagia, unspecified type    3. Laryngopharyngeal reflux (LPR)           I recommend MBS and empiric tx for reflux for 1-2 months.        Maximiliano Alicea MD  [facility]   [facility]  [id number] [place] [address].  [place] [address]  P: [phone]  F: [phone]

## 2025-03-20 ENCOUNTER — TELEPHONE (OUTPATIENT)
Dept: INTERNAL MEDICINE | Facility: CLINIC | Age: 55
End: 2025-03-20
Payer: COMMERCIAL

## 2025-03-20 ENCOUNTER — OFFICE VISIT (OUTPATIENT)
Dept: OTOLARYNGOLOGY | Facility: CLINIC | Age: 55
End: 2025-03-20
Payer: COMMERCIAL

## 2025-03-20 VITALS — WEIGHT: 198.44 LBS | HEIGHT: 73 IN | BODY MASS INDEX: 26.3 KG/M2

## 2025-03-20 DIAGNOSIS — R13.12 OROPHARYNGEAL DYSPHAGIA: Primary | ICD-10-CM

## 2025-03-20 DIAGNOSIS — K21.9 LARYNGOPHARYNGEAL REFLUX (LPR): ICD-10-CM

## 2025-03-20 DIAGNOSIS — R13.10 DYSPHAGIA, UNSPECIFIED TYPE: ICD-10-CM

## 2025-03-20 PROCEDURE — 99999 PR PBB SHADOW E&M-EST. PATIENT-LVL III: CPT | Mod: PBBFAC,,, | Performed by: STUDENT IN AN ORGANIZED HEALTH CARE EDUCATION/TRAINING PROGRAM

## 2025-03-20 RX ORDER — PANTOPRAZOLE SODIUM 40 MG/1
40 TABLET, DELAYED RELEASE ORAL DAILY
Qty: 90 TABLET | Refills: 3 | Status: SHIPPED | OUTPATIENT
Start: 2025-03-20 | End: 2026-03-20

## 2025-03-20 NOTE — TELEPHONE ENCOUNTER
----- Message from Antoine Elliott MD sent at 3/20/2025 11:21 AM CDT -----      ----- Message -----  From: Antoine Elliott MD  Sent: 3/19/2025   1:53 PM CDT  To: Lexi Modi    Based on your recent labs.  Cholesterol medication is recommended for prevention of cardiovascular disease.  ----- Message -----  From: Brian, [facility] Interface  Sent: 3/14/2025   2:43 PM CDT  To: Antoine Elliott MD

## 2025-03-20 NOTE — TELEPHONE ENCOUNTER
Please set up appt to discuss lab results next steps regarding cholesterol. Virtual is ok. Private slot ok if needed.

## 2025-03-21 ENCOUNTER — PATIENT MESSAGE (OUTPATIENT)
Dept: INTERNAL MEDICINE | Facility: CLINIC | Age: 55
End: 2025-03-21

## 2025-03-21 ENCOUNTER — OFFICE VISIT (OUTPATIENT)
Dept: INTERNAL MEDICINE | Facility: CLINIC | Age: 55
End: 2025-03-21
Payer: COMMERCIAL

## 2025-03-21 DIAGNOSIS — E78.5 HYPERLIPIDEMIA, UNSPECIFIED HYPERLIPIDEMIA TYPE: Primary | ICD-10-CM

## 2025-03-21 DIAGNOSIS — Z13.6 ENCOUNTER FOR SCREENING FOR CARDIOVASCULAR DISORDERS: ICD-10-CM

## 2025-03-21 DIAGNOSIS — Z91.89 INTERMEDIATE RISK FOR CORONARY ARTERY DISEASE: ICD-10-CM

## 2025-03-21 RX ORDER — ROSUVASTATIN CALCIUM 10 MG/1
10 TABLET, COATED ORAL DAILY
Qty: 90 TABLET | Refills: 1 | Status: SHIPPED | OUTPATIENT
Start: 2025-03-21

## 2025-03-21 NOTE — PATIENT INSTRUCTIONS
Call billing department for pricing before completing your CT scan.  [place] phone number [phone].     Start rosuvastatin after completed ct scan.

## 2025-03-21 NOTE — PROGRESS NOTES
Subjective:      Patient ID: Wil Gamble is a 54 y.o. male.    Chief Complaint: No chief complaint on file.    HPI  History of Present Illness               The patient location is: [place]  The chief complaint leading to consultation is: hyperlipidemia     Visit type:  Audiovisual    Face to Face time with patient: 15   minutes of total time spent on the encounter, which includes face to face time and non-face to face time preparing to see the patient (eg, review of tests), Obtaining and/or reviewing separately obtained history, Documenting clinical information in the electronic or other health record, Independently interpreting results (not separately reported) and communicating results to the patient/family/caregiver, or Care coordination (not separately reported).         Each patient to whom he or she provides medical services by telemedicine is:  (1) informed of the relationship between the physician and patient and the respective role of any other health care provider with respect to management of the patient; and (2) notified that he or she may decline to receive medical services by telemedicine and may withdraw from such care at any time.    Notes:     Presents today to discuss hyperlipidemia.   No previous treatment for this.   Pt interested in ct calcium scoring.     family history includes Arthritis in his maternal grandmother, mother, and sister; Cancer in his maternal grandfather; Diabetes in his father and paternal grandmother; Hearing loss in his father and paternal grandfather; Heart disease (age of onset: 55) in his paternal grandfather; Heart disease (age of onset: 60 - 69) in his father; Rheum arthritis in his mother.         Review of Systems   Constitutional:  Negative for activity change and unexpected weight change.   HENT:  Positive for trouble swallowing. Negative for hearing loss and rhinorrhea.    Eyes:  Negative for discharge and visual disturbance.   Respiratory:  Negative for  chest tightness and wheezing.    Cardiovascular:  Negative for chest pain and palpitations.   Gastrointestinal:  Negative for blood in stool, constipation, diarrhea and vomiting.   Endocrine: Negative for polydipsia and polyuria.   Genitourinary:  Negative for difficulty urinating, hematuria and urgency.   Musculoskeletal:  Negative for arthralgias, joint swelling and neck pain.   Neurological:  Negative for weakness and headaches.   Psychiatric/Behavioral:  Negative for confusion and dysphoric mood.      Objective:   There were no vitals taken for this visit.    Physical Exam  Constitutional:       General: He is awake.      Appearance: Normal appearance.   HENT:      Head: Normocephalic and atraumatic.   Eyes:      Conjunctiva/sclera: Conjunctivae normal.   Pulmonary:      Effort: Pulmonary effort is normal.   Musculoskeletal:      Cervical back: Normal range of motion.   Neurological:      Mental Status: He is alert. Mental status is at baseline.   Psychiatric:         Mood and Affect: Mood normal.         Behavior: Behavior normal. Behavior is cooperative.         Thought Content: Thought content normal.         Judgment: Judgment normal.         Lab Results   Component Value Date    WBC 4.54 03/14/2025    HGB 14.6 03/14/2025    HGB 15.4 03/15/2024    HCT 42.4 03/14/2025    MCV 87 03/14/2025    MCV 88 03/15/2024     03/14/2025    CHOL 228 (H) 03/14/2025    TRIG 106 03/14/2025    HDL 40 03/14/2025    LDLCALC 166.8 (H) 03/14/2025    LDLCALC 176.2 (H) 03/15/2024    ALT 21 03/14/2025    AST 22 03/14/2025     03/14/2025    K 4.3 03/14/2025    CALCIUM 8.9 03/14/2025     03/14/2025    CO2 28 03/14/2025    BUN 16 03/14/2025    CREATININE 0.8 03/14/2025    CREATININE 0.9 03/15/2024    EGFRNORACEVR >60.0 03/14/2025    EGFRNORACEVR >60.0 03/15/2024    TSH 1.290 03/14/2025    TSH 1.566 03/15/2024    PSA 0.37 03/14/2025    PSA 0.30 03/15/2024    GLU 80 03/14/2025    HGBA1C 5.0 03/14/2025    HGBA1C 5.0  03/15/2024          The 10-year ASCVD risk score (Carla ELIZALDE, et al., 2019) is: 6.2%    Values used to calculate the score:      Age: 54 years      Sex: Male      Is Non- : No      Diabetic: No      Tobacco smoker: No      Systolic Blood Pressure: 122 mmHg      Is BP treated: No      HDL Cholesterol: 40 mg/dL      Total Cholesterol: 228 mg/dL     Assessment:     1. Hyperlipidemia, unspecified hyperlipidemia type    2. Encounter for screening for cardiovascular disorders    3. Intermediate risk for coronary artery disease      Plan:   1. Hyperlipidemia, unspecified hyperlipidemia type  -     CT Calcium Scoring Cardiac; Future; Expected date: 03/21/2025  -     rosuvastatin (CRESTOR) 10 MG tablet; Take 1 tablet (10 mg total) by mouth once daily.  Dispense: 90 tablet; Refill: 1  -     ALT (SGPT); Future; Expected date: 06/21/2025  -     Lipid Panel; Future; Expected date: 06/19/2025    2. Encounter for screening for cardiovascular disorders  -     CT Calcium Scoring Cardiac; Future; Expected date: 03/21/2025    3. Intermediate risk for coronary artery disease  -     CT Calcium Scoring Cardiac; Future; Expected date: 03/21/2025        Patient Instructions   Call billing department for pricing before completing your CT scan.  [place] phone number [phone].     Start rosuvastatin after completed ct scan.     Future Appointments   Date Time Provider Department Center   7/1/2025  2:40 PM Antoine Elliott MD [facility] [place]   3/12/2026  2:40 PM Antoine Elliott MD [facility] [place]       Lab Frequency Next Occurrence   CT Cardiac Scoring Once 03/27/2024   Fl Modified Barium Swallow Speech Once 03/20/2025   SLP video swallow Once 03/20/2025       Follow up in about 3 months (around 6/21/2025), or if symptoms worsen or fail to improve.

## 2025-03-24 PROBLEM — Z00.00 ROUTINE GENERAL MEDICAL EXAMINATION AT A HEALTH CARE FACILITY: Status: ACTIVE | Noted: 2025-03-24

## 2025-03-31 ENCOUNTER — CLINICAL SUPPORT (OUTPATIENT)
Dept: SPEECH THERAPY | Facility: HOSPITAL | Age: 55
End: 2025-03-31
Attending: STUDENT IN AN ORGANIZED HEALTH CARE EDUCATION/TRAINING PROGRAM
Payer: COMMERCIAL

## 2025-03-31 ENCOUNTER — HOSPITAL ENCOUNTER (OUTPATIENT)
Dept: RADIOLOGY | Facility: HOSPITAL | Age: 55
Discharge: HOME | End: 2025-03-31
Attending: STUDENT IN AN ORGANIZED HEALTH CARE EDUCATION/TRAINING PROGRAM
Payer: COMMERCIAL

## 2025-03-31 DIAGNOSIS — K21.9 LARYNGOPHARYNGEAL REFLUX (LPR): ICD-10-CM

## 2025-03-31 DIAGNOSIS — R13.12 OROPHARYNGEAL DYSPHAGIA: ICD-10-CM

## 2025-03-31 DIAGNOSIS — K22.4 ESOPHAGEAL DYSMOTILITIES: Primary | ICD-10-CM

## 2025-03-31 PROCEDURE — 74230 X-RAY XM SWLNG FUNCJ C+: CPT | Mod: TC

## 2025-03-31 PROCEDURE — A9698 NON-RAD CONTRAST MATERIALNOC: HCPCS | Performed by: STUDENT IN AN ORGANIZED HEALTH CARE EDUCATION/TRAINING PROGRAM

## 2025-03-31 PROCEDURE — 92611 MOTION FLUOROSCOPY/SWALLOW: CPT | Performed by: SPEECH-LANGUAGE PATHOLOGIST

## 2025-03-31 PROCEDURE — 92526 ORAL FUNCTION THERAPY: CPT | Performed by: SPEECH-LANGUAGE PATHOLOGIST

## 2025-03-31 PROCEDURE — 74230 X-RAY XM SWLNG FUNCJ C+: CPT | Mod: 26,,, | Performed by: RADIOLOGY

## 2025-03-31 PROCEDURE — 92610 EVALUATE SWALLOWING FUNCTION: CPT | Performed by: SPEECH-LANGUAGE PATHOLOGIST

## 2025-03-31 PROCEDURE — 25500020 PHARM REV CODE 255: Performed by: STUDENT IN AN ORGANIZED HEALTH CARE EDUCATION/TRAINING PROGRAM

## 2025-03-31 RX ADMIN — BARIUM SULFATE 20 ML: 0.81 POWDER, FOR SUSPENSION ORAL at 02:03

## 2025-03-31 NOTE — PROGRESS NOTES
"[facility]   Outpatient MODIFIED BARIUM SWALLOW STUDY    Date: 3/31/2025     Name: Wil Gamble   MRN: [id number]    Therapy Diagnosis: WFL oral and pharyngeal phases of the swallow    Physician: Maximiliano Alicea MD  Physician Orders: Fl Modified Barium Swallow Speech/SLP Video Swallow  Medical Diagnosis from Referral: Oropharyngeal dysphagia [R13.12], Laryngopharyngeal reflux (LPR) [K21.9]    Date of Evaluation:  3/31/2025    Procedure Min.   Swallow and Oral Function Evaluation   10   Fl Modified Barium Swallow Speech  10   Dysphagia Therapy   15     Time in: 1:35 PM  Time out: 2:10 PM  Total Billable Time: 35 minutes    Radiation time: 2.3 minutes    Precautions: Standard  Subjective   History of Current Condition: Wil Gamble is a 54 y.o. male here today for Modified Barium Swallow Study (MBSS). Patient's medical history is significant for hyperlipidemia. He reports intermittent difficulty swallowing which he reports onset 6-8 months ago, and he describes as sticking/effortful to clear. Symptoms do not occur with specific foods (moreso solids such as meat over liquids) or at certain times. He does report "sticking" with cold liquids in his mid-chest which has been occurring "his whole life" and is unrelated to new swallowing difficulty. He denies difficulty swallowing pills. He denies odynophagia, overt signs of aspiration, unintentional weight loss, recent PNA, or GERD. He was placed on PPI about a week ago following ENT visit. Also of note, patient reports he had surgery as an infant to "open the sphincter" above his stomach.     In consideration of the interrelationships between body systems and swallowing, History was provided by patient, and/or taken from chart review. The following are medical conditions present in the patient's history which can result in or be attributed to dysphagia:  Respiratory None noted in this category   Cardiovascular Hyperlipidemia   Digestive None " "noted in this category   Infections  None noted in this category   Urinary None noted in this category   Endocrine None noted in this category   Nervous None noted in this category   Skeletal None noted in this category   Immune None noted in this category   Cancer None noted in this category   Psychiatric  None noted in this category   Congenital  None noted in this category   Other None noted in this category     -Current diet at home: regular diet with thin liquids (IDDSI 7/0)  -Recommended diet from previous study: NA  -Therapy received: NA    The following observations were made:   -Mental status: Alert and Cooperative  -Factors affecting performance: no difficulties participating in the study  -Feeding Method: independent in self-feeding    Respiratory Status:   -Respiratory Status: room air    Past Medical History: Wil Gamble  has no past medical history on file.  Wil Gamble  has a past surgical history that includes Eye surgery (12/1999); Tonsillectomy (1/1975); Cholecystectomy (2015); Vasectomy (2016); and Colonoscopy (N/A, 3/25/2024).    Medical Hx and Allergies: Review of patient's allergies indicates:  No Known Allergies    Relevant Imaging:  All relevant imaging reviewed     Objective     Modified Barium Swallow Study  Purpose: to evaluate anatomy and physiology of the oropharyngeal swallow, to determine effectiveness of rehabilitation strategies, and to determine diet consistency and intervention recommendations. The study was performed using the "Gold Standard" of 30 fps with as low as reasonably achievable (ALARA) exposure.     The patient was seen in radiology seated in [place]'s position in a video imaging chair for lateral views of the larynx and an A/P view. The study was conducted using Varibar thin liquid (IDDSI 0), Varibar nectar liquid (IDDSI 2), Varibar pudding (IDDSI 4), Peaches mixed with Varibar thin liquid (IDDSI 6/0), and solid coated in Varibar pudding (IDDSI 7). " He tolerated the procedure well.    A cranial nerve evaluation revealed:   Cranial Nerve Examination  Cranial Nerve 5: Trigeminal Nerve  Motor Jaw Posture at rest: Closed  Mandible Elevation/Depression: WFL  Mandible lateralization: WFL  Abnormal movement: absent Interpretation: Intact bilaterally    Sensory Forehead: WFL  Cheek: WFL  Jaw: WFL  Facial Pain: None noted Interpretation: Intact bilaterally      Cranial Nerve 7: Facial Nerve  Motor Facial Symmetry: WNL  Wrinkle Forehead: WFL  Close eyes tightly: WFL  Labial Protrusion: WFL  Labial Retraction: WFL  Buccal Strength with Labial Seal: WFL  Abnormal movement: absent Interpretation: Intact bilaterally    Sensory Formal testing not completed.       Cranial Nerves IX and X: Glossopharyngeal and Vagus Nerves  Motor Palatal Symmetry (Rest): WNL  Palatal Symmetry (Movement): WNL  Cough: Perceptually strong  Voice Prior to PO intake: Clear  Resonance: Normal  Abnormal movement: absent Interpretation: Intact bilaterally      Cranial Nerve XII: Hypoglossal Nerve  Motor Tongue at rest: WNL  Lingual Protrusion: WNL  Lingual Protrusion against Resistance: WNL  Lingual Lateralization: WNL  Abnormal movement: absent Interpretation: Intact bilaterally      Other information:  Volitional Swallow: Able to palpate laryngeal rise  Mucosal Quality: No abnormal findings  Secretion Management: no overt deficits noted/observed  Dentition: Good condition for speech and mastication        Consistency  Presentation  Findings Strategy Attempted Rosenbeck's Penetration/Aspiration Scale (PAS)   Thin (IDDSI 0) Method: Self-fed    Volume: cup sip x5,  sequential sips    Projection: lateral view; AP view  Oral phase: WFL    Pharyngeal phase: WFL    Esophageal screen: in AP view, retrograde flow of bolus and retention of most of bolus at the distal esophagus which does not clear within 1 minute of swallow initiation.  Best: (1) Material does not enter the airway    Worst: (1) Material does  not enter the airway     Nectar thick (mildly thick/IDDSI 2) Method:Self-fed    Volume: cup sip x2     Projection: lateral view Oral phase: WFL    Pharyngeal phase: trace base of tongue and valleculae residue clears with spontaneous sequential swallow       Best: (1) Material does not enter the airway    Worst: (1) Material does not enter the airway     Puree (extremely thick/ IDDSI 4) Method: Self-fed    Volume: tsp bite x2     Projection: lateral view Oral phase: WFL    Pharyngeal phase: WFL        Best: (1) Material does not enter the airway    Worst: (1) Material does not enter the airway     Solid (regular/ IDDSI 7) Method: Self-fed    Volume: bite x3     Projection: lateral view, AP view Oral phase: WFL    Pharyngeal phase: trace base of tongue residue clears with spontaneous sequential swallow    Esophageal screen: in AP view, entire bolus with retrograde flow at the distal esophagus as well as is retained at the distal esophagus and does not clear within 1 minute of swallow initiation   Best: (1) Material does not enter the airway    Worst: (1) Material does not enter the airway     Mixed consistency (thin/ IDDSI 0 + soft and bite sized/ IDDSI 6) Method: Self-fed    Volume: bite x2     Projection: lateral view Oral phase: WFL    Pharyngeal phase: WFL  Best: (1) Material does not enter the airway    Worst: (1) Material does not enter the airway     Barium tablet  Method: Self-fed    Volume: single tablet with water bolus(es)    Projection: AP view Timely and efficient oropharyngeal clearance. Tablet remains retained superior to LES at the distal esophagus and does not clear within 1 minute of swallow initiation.          Treatment   Treatment Time In: 1:55 PM  Treatment Time Out: 2:10 PM  Total Treatment Time: 15 mins  Patient educated regarding results and recommendations of the evaluation. See the recommendations section below.    Education: Plan of Care and role of SLP in care, and anatomy and physiology  of swallow mechanism as it relates to MBSS findings and recommendations were discussed with the patient. Patient expressed understanding. All questions were answered.     Assessment     Wil Gamble is a 54 y.o. male referred for Modified Barium Swallow Study with a medical diagnosis of Oropharyngeal dysphagia [R13.12], Laryngopharyngeal reflux (LPR) [K21.9]. The patient presents with oropharyngeal swallow within functional limits as determined by the Dysphagia Outcome and Severity Scale (ARLET). Level 7: Normal in all situations.    Modified Barium Swallow Study (MBSS) revealed oral phase characterized by adequate lingual and labial strength and range of motion for tongue control, bolus preparation and transport. Lip closure was adequate with no labial escape. Bolus prep and mastication was timely and efficient. Lingual motion was brisk for adequate bolus transport. There was no significant oral residue. The swallow was initiated when the head of the bolus passed the ramus of the mandible.    Pharyngeal phase characterized by timely initiation of swallow across consistencies. The soft palate elevated for complete closure of the velopharyngeal port. During pharyngeal swallow, adequate base of tongue retraction, anterior hyoid excursion, laryngeal elevation, and pharyngeal stripping wave resulted in complete epiglottic inversion and UES opening. No penetration, aspiration, or significant pharyngeal residue was observed in today's study.     Robust Esophageal Screening Test (REST) was used to screen esophageal phase of swallow (Connie et al, 2021) in today's study, completed in anterior/posterior view, with intake of barium coated solid, large self-regulated sips of liquid, and barium tablet. Screening significant for mixed anatomic abnormality and dysmotility with deviation of solid bolus flow through the esophagus, retention of barium tablet >1 minute, deviation of straight esophageal contour/deviation of  liquid bolus flow, disordered peristalsis resulting in retrograde flow/retention of bolus >1 minute for solid or liquid, and aerophagia noted. Further esophageal imaging including esophagogastroduodenoscopy (EGD) or barium esophagram as well as follow-up with GI is recommended.      Impressions: Patient presents with WFL oral and pharyngeal phases of the swallow; symptoms likely chronic and related to primary esophageal findings in today's study consistent with dysmotility and/or anatomic abnormality resulting in retrograde flow at the distal esophagus across consistencies, aerophagia, and retention across all boluses (pill, solid, and liquids) at the level of the distal esophagus just superior to the LES > 1 minute post swallow initiation. Patient was educated regarding findings and recommendations including anatomy and physiology of swallow mechanism, reflux/dysmotility precautions, and recommendation for further evaluation with GI. He expressed understanding of information provided. In consideration of the Dynamic Imaging Grade of Swallowing Toxicity (DIGEST) (Sudha et al, 2017), patient presents with preserved safety of swallow and preserved efficiency of swallow. Patient appears to be at low risk for aspiration related PNA in consideration of three pillars of aspiration pneumonia (Liang, 2005) including oral health status, overall health/immune status, and laryngeal vestibule closure/severity of dysphagia. However, unable to assess risk related to aspiration pneumonia cause by the aspiration of gastric content. Patient appears to be a poor candidate for behavioral swallow rehabilitation.     Functional Oral Intake Scale (FOIS)  The Functional Oral Intake Scale (FOIS) is an ordinal scale that is used to assess the current status and meaningful change in the oral intake. FOIS levels include:    TUBE DEPENDENT (levels 1-3) 1. No oral intake  2. Tube dependent with minimal/inconsistent oral intake  3. Tube  supplements with consistent oral intake      TOTAL ORAL INTAKE (levels 4-7) 4. Total oral intake of a single consistency  5. Total oral intake of multiple consistencies requiring special preparation  6. Total oral intake with no special preparation, but must avoid specific foods or liquid items  7. Total oral intake with no restrictions     Patient is currently judged to be at FOIS level 7.      References:  LEX Tavera (2005, March). Pneumonia: Factors Beyond Aspiration. Perspectives in Swallowing and Swallowing Disorders (Dysphagia), 14, 10-16.  Sukhi KA, Cee MP, Kevin DA, Davonte WELLERK, Bessie HY, Andres RS, Nikolas CD, Francis SY, Felipe CP, Ramón J, Lazarus CL, May A, Amari J, Dennis JW, Julianne HM, Abhi JS. Dynamic Imaging Grade of Swallowing Toxicity (DIGEST): Scale development and validation. Cancer. 2017 Jan 1;123(1):62-70. doi: 10.1002/cncr.49884. Epub 2016 Aug 26. PMID: 65089345; PMCID: [id number].  NICOLÁS Rosales., NITHIN Triana., CORY Oliveira, & NITHIN Parikh. (2021). Diagnostic Accuracy of an Esophageal Screening Protocol Interpreted by the Speech-Language Pathologist. Dysphagia, 36(6), 6579-1504. [email or web address]    Recommendations:     Consistency Recommendations: Thin liquids (IDDSI 0) and Regular  consistencies (IDDSI 7).  Medications should be taken whole in thin liquids.   Risk Management/Swallow Guidelines: use good oral hygiene, sit upright for all PO intake, increase physical mobility as tolerated, behavioral reflux precautions, remain upright for at least 2-3 hours following any PO intake, and eat small meals throughout the day to reduce discomfort associated with delayed emptying of the esophagus  Specialist Referrals: GI  Ancillary Tests: Consider EGD and/or barium esophagram   Therapy: Dysphagia therapy is not recommended at this time.  Follow-up exam: Follow up swallow study is not indicated at this time.    Please contact [facility] at [phone] if there are questions re: the above or if we can be of additional service to this patient.    Anamaria Stroud MA, L-SLP, CCC-SLP  Speech Language Pathologist  3/31/2025

## 2025-03-31 NOTE — Clinical Note
"Nanette Alicea, I completed this patient's MBSS today-his oropharyngeal swallow is grossly WFL. He has signs of esophageal dysmotility and retention at his distal esophagus. He said he had surgery as an infant to "open his lower esophagus". I'm curious if this is isolated dysmotility/reflux or something structural after this surgery? I think he would benefit from an EGD and/or esophagram if you don't mind placing the order. Thanks! Anamaria "

## 2025-04-04 ENCOUNTER — PATIENT MESSAGE (OUTPATIENT)
Dept: HEPATOLOGY | Facility: CLINIC | Age: 55
End: 2025-04-04
Payer: COMMERCIAL

## 2025-04-23 ENCOUNTER — HOSPITAL ENCOUNTER (OUTPATIENT)
Dept: RADIOLOGY | Facility: HOSPITAL | Age: 55
Discharge: HOME | End: 2025-04-23
Attending: INTERNAL MEDICINE
Payer: COMMERCIAL

## 2025-04-23 DIAGNOSIS — Z91.89 INTERMEDIATE RISK FOR CORONARY ARTERY DISEASE: ICD-10-CM

## 2025-04-23 DIAGNOSIS — E78.5 HYPERLIPIDEMIA, UNSPECIFIED HYPERLIPIDEMIA TYPE: ICD-10-CM

## 2025-04-23 DIAGNOSIS — Z13.6 ENCOUNTER FOR SCREENING FOR CARDIOVASCULAR DISORDERS: ICD-10-CM

## 2025-04-23 PROCEDURE — 75571 CT HRT W/O DYE W/CA TEST: CPT | Mod: TC

## 2025-04-23 PROCEDURE — 75571 CT HRT W/O DYE W/CA TEST: CPT | Mod: 26,,, | Performed by: RADIOLOGY

## 2025-04-29 ENCOUNTER — RESULTS FOLLOW-UP (OUTPATIENT)
Dept: INTERNAL MEDICINE | Facility: CLINIC | Age: 55
End: 2025-04-29

## 2025-06-25 ENCOUNTER — PATIENT MESSAGE (OUTPATIENT)
Dept: INTERNAL MEDICINE | Facility: CLINIC | Age: 55
End: 2025-06-25
Payer: COMMERCIAL

## 2025-06-25 NOTE — TELEPHONE ENCOUNTER
I do not review outside labs until I am with patient for appointment. During appointment we can review together and add any needed labs. Appt was due June 21, 25. Virtual visit is ok.

## 2025-08-27 ENCOUNTER — PATIENT MESSAGE (OUTPATIENT)
Dept: INTERNAL MEDICINE | Facility: CLINIC | Age: 55
End: 2025-08-27
Payer: COMMERCIAL

## 2025-08-29 ENCOUNTER — LAB VISIT (OUTPATIENT)
Dept: LAB | Facility: HOSPITAL | Age: 55
End: 2025-08-29
Attending: INTERNAL MEDICINE
Payer: COMMERCIAL

## 2025-08-29 DIAGNOSIS — E78.5 HYPERLIPIDEMIA, UNSPECIFIED HYPERLIPIDEMIA TYPE: ICD-10-CM

## 2025-08-29 LAB — ALT SERPL W/O P-5'-P-CCNC: 39 UNIT/L (ref 0–55)

## 2025-08-29 PROCEDURE — 36415 COLL VENOUS BLD VENIPUNCTURE: CPT

## 2025-08-29 PROCEDURE — 84460 ALANINE AMINO (ALT) (SGPT): CPT
